# Patient Record
Sex: MALE | Race: WHITE
[De-identification: names, ages, dates, MRNs, and addresses within clinical notes are randomized per-mention and may not be internally consistent; named-entity substitution may affect disease eponyms.]

---

## 2017-12-17 ENCOUNTER — HOSPITAL ENCOUNTER (EMERGENCY)
Dept: HOSPITAL 62 - ER | Age: 37
Discharge: HOME | End: 2017-12-17
Payer: SELF-PAY

## 2017-12-17 VITALS — SYSTOLIC BLOOD PRESSURE: 147 MMHG | DIASTOLIC BLOOD PRESSURE: 97 MMHG

## 2017-12-17 DIAGNOSIS — R10.9: ICD-10-CM

## 2017-12-17 DIAGNOSIS — N30.00: Primary | ICD-10-CM

## 2017-12-17 DIAGNOSIS — F17.200: ICD-10-CM

## 2017-12-17 DIAGNOSIS — N20.0: ICD-10-CM

## 2017-12-17 LAB
ALBUMIN SERPL-MCNC: 4.3 G/DL (ref 3.5–5)
ALP SERPL-CCNC: 66 U/L (ref 38–126)
ALT SERPL-CCNC: 29 U/L (ref 21–72)
ANION GAP SERPL CALC-SCNC: 12 MMOL/L (ref 5–19)
APPEARANCE UR: (no result)
AST SERPL-CCNC: 26 U/L (ref 17–59)
BASOPHILS # BLD AUTO: 0.1 10^3/UL (ref 0–0.2)
BASOPHILS NFR BLD AUTO: 0.7 % (ref 0–2)
BILIRUB DIRECT SERPL-MCNC: 0.3 MG/DL (ref 0–0.4)
BILIRUB SERPL-MCNC: 0.7 MG/DL (ref 0.2–1.3)
BILIRUB UR QL STRIP: NEGATIVE
BUN SERPL-MCNC: 6 MG/DL (ref 7–20)
CALCIUM: 9.5 MG/DL (ref 8.4–10.2)
CHLORIDE SERPL-SCNC: 110 MMOL/L (ref 98–107)
CO2 SERPL-SCNC: 21 MMOL/L (ref 22–30)
CREAT SERPL-MCNC: 0.89 MG/DL (ref 0.52–1.25)
EOSINOPHIL # BLD AUTO: 0 10^3/UL (ref 0–0.6)
EOSINOPHIL NFR BLD AUTO: 0.2 % (ref 0–6)
ERYTHROCYTE [DISTWIDTH] IN BLOOD BY AUTOMATED COUNT: 13.2 % (ref 11.5–14)
GLUCOSE SERPL-MCNC: 92 MG/DL (ref 75–110)
GLUCOSE UR STRIP-MCNC: NEGATIVE MG/DL
HCT VFR BLD CALC: 46.7 % (ref 37.9–51)
HGB BLD-MCNC: 16.4 G/DL (ref 13.5–17)
HGB HCT DIFFERENCE: 2.5
KETONES UR STRIP-MCNC: 20 MG/DL
LYMPHOCYTES # BLD AUTO: 1.1 10^3/UL (ref 0.5–4.7)
LYMPHOCYTES NFR BLD AUTO: 12.7 % (ref 13–45)
MCH RBC QN AUTO: 33.4 PG (ref 27–33.4)
MCHC RBC AUTO-ENTMCNC: 35.2 G/DL (ref 32–36)
MCV RBC AUTO: 95 FL (ref 80–97)
MONOCYTES # BLD AUTO: 0.5 10^3/UL (ref 0.1–1.4)
MONOCYTES NFR BLD AUTO: 5.6 % (ref 3–13)
NEUTROPHILS # BLD AUTO: 6.8 10^3/UL (ref 1.7–8.2)
NEUTS SEG NFR BLD AUTO: 80.8 % (ref 42–78)
NITRITE UR QL STRIP: NEGATIVE
PH UR STRIP: 8 [PH] (ref 5–9)
POTASSIUM SERPL-SCNC: 4 MMOL/L (ref 3.6–5)
PROT SERPL-MCNC: 7 G/DL (ref 6.3–8.2)
PROT UR STRIP-MCNC: NEGATIVE MG/DL
RBC # BLD AUTO: 4.93 10^6/UL (ref 4.35–5.55)
SODIUM SERPL-SCNC: 142.8 MMOL/L (ref 137–145)
SP GR UR STRIP: 1.01
UROBILINOGEN UR-MCNC: NEGATIVE MG/DL (ref ?–2)
WBC # BLD AUTO: 8.4 10^3/UL (ref 4–10.5)

## 2017-12-17 PROCEDURE — 96375 TX/PRO/DX INJ NEW DRUG ADDON: CPT

## 2017-12-17 PROCEDURE — 76380 CAT SCAN FOLLOW-UP STUDY: CPT

## 2017-12-17 PROCEDURE — 99284 EMERGENCY DEPT VISIT MOD MDM: CPT

## 2017-12-17 PROCEDURE — 81001 URINALYSIS AUTO W/SCOPE: CPT

## 2017-12-17 PROCEDURE — 36415 COLL VENOUS BLD VENIPUNCTURE: CPT

## 2017-12-17 PROCEDURE — 87040 BLOOD CULTURE FOR BACTERIA: CPT

## 2017-12-17 PROCEDURE — 87086 URINE CULTURE/COLONY COUNT: CPT

## 2017-12-17 PROCEDURE — 83605 ASSAY OF LACTIC ACID: CPT

## 2017-12-17 PROCEDURE — S0119 ONDANSETRON 4 MG: HCPCS

## 2017-12-17 PROCEDURE — 85025 COMPLETE CBC W/AUTO DIFF WBC: CPT

## 2017-12-17 PROCEDURE — 96376 TX/PRO/DX INJ SAME DRUG ADON: CPT

## 2017-12-17 PROCEDURE — 80053 COMPREHEN METABOLIC PANEL: CPT

## 2017-12-17 PROCEDURE — 96365 THER/PROPH/DIAG IV INF INIT: CPT

## 2017-12-17 NOTE — ER DOCUMENT REPORT
ED Medical Screen (RME)





- General


Chief Complaint: Flank Pain


Stated Complaint: RIGHT FLANK PAIN


Time Seen by Provider: 12/17/17 16:09


Notes: 





37-year-old male patient complaining of flank pain.  Reports pain started this 

morning.  He sees a urologist in [facility].  He was in this area 

visiting today.  He reports he had a stone lasered 2 weeks ago, and remove the 

stent one half weeks ago.  He reports that stone was lasered 2 weeks prior to 

the last 1 on the other side.  He reports a total of 19 stones in his past.





Review of the [place] controlled database suggests his visits for pain 

medication are legitimate, unless he has been going in the [place] for 

treatment also.








I have greeted and performed a rapid initial assessment of this patient.  A 

comprehensive ED assessment and evaluation of the patient, analysis of test 

results and completion of the medical decision making process will be conducted 

by additional ED providers.


TRAVEL OUTSIDE OF THE [place] IN LAST 30 DAYS: No





- Related Data


Allergies/Adverse Reactions: 


 





ketorolac [From Toradol] Allergy (Verified 12/17/17 15:30)


 











Past Medical History





- Social History


Chew tobacco use (# tins/day): No


Frequency of alcohol use: None


Drug Abuse: None


Renal/ Medical History: Denies: Hx Peritoneal Dialysis





Physical Exam





- Vital signs


Vitals: 





 











Temp Pulse Resp BP Pulse Ox


 


 97.6 F   136 H  20   122/77   100 


 


 12/17/17 15:55  12/17/17 15:55  12/17/17 15:55  12/17/17 15:55  12/17/17 15:55














Course





- Vital Signs


Vital signs: 





 











Temp Pulse Resp BP Pulse Ox


 


 97.6 F   136 H  20   122/77   100 


 


 12/17/17 15:55  12/17/17 15:55  12/17/17 15:55  12/17/17 15:55  12/17/17 15:55

## 2017-12-17 NOTE — RADIOLOGY REPORT (SQ)
EXAM DESCRIPTION:  CT LTD RENAL STONE PROTOCOL ON



COMPLETED DATE/TIME:  12/17/2017 5:40 pm



REASON FOR STUDY:  right flank pain, h/o kidney stones, UTI



COMPARISON:  None.



TECHNIQUE:  CT scan of the abdomen and pelvis performed without intravenous or oral contrast. Images 
reviewed with lung, soft tissue, and bone windows. Reconstructed coronal and sagittal MPR images revi
ewed. All images stored on PACS.

All CT scanners at this facility use dose modulation, iterative reconstruction, and/or weight based d
osing when appropriate to reduce radiation dose to as low as reasonably achievable (ALARA).

[facility]: Dose Right  [facility]: CareDose    [facility]: Dose Right    [facility]: Teradose 4D    [facility]: Smart ProspectWise



RADIATION DOSE:  CT Rad equipment meets quality standard of care and radiation dose reduction techniq
ues were employed. CTDIvol: 4.8 mGy. DLP: 245 mGy-cm.mGy.



LIMITATIONS:  None.



FINDINGS:  LOWER CHEST: No significant findings. No nodules or infiltrates.

NON-CONTRASTED LIVER, SPLEEN, ADRENALS: Slightly heterogeneous regions in the left hepatic lobe inclu
ding a 2 cm low-density subcapsular focus close to the falciform ligament and at least 1 probable tin
y sub 6 cm cyst in the lateral segment left lobe.  Doubtful clinical significance in a patient of thi
s age.  Spleen unremarkable.  No adrenal pathology.

PANCREAS: No masses. No peripancreatic inflammatory changes.

GALLBLADDER: No identified stones by CT criteria. No inflammatory changes to suggest cholecystitis.

RIGHT KIDNEY AND URETER: Minimal punctate nonobstructing stone.  There is no evidence of hydronephros
is.  No ureteral stone evident.

LEFT KIDNEY AND URETER: Minimal nonobstructing nephrolithiasis, several tiny stones without obstructi
on evident.  No ureteral stones.

AORTA AND RETROPERITONEUM: No aneurysm. No retroperitoneal masses or adenopathy.

BOWEL AND PERITONEAL CAVITY: No obvious masses or inflammatory changes. No free fluid.

APPENDIX: Normal.

PELVIS, BLADDER, AND ABDOMINAL WALL:No abnormal masses. No free fluid. Bladder normal.

BONES: No significant findings.

OTHER: No other significant finding.



IMPRESSION:  1. Nonobstructing nephrolithiasis, bilateral.  No evidence, however, of significant hydr
onephrosis.  No ureteral stones.  No bladder stones.  2.  No acute abdominal abnormality.



TECHNICAL DOCUMENTATION:  JOB ID:  [id number]

Quality ID # [id number]: Final reports with documentation of one or more dose reduction techniques (e.g., Au
tomated exposure control, adjustment of the mA and/or kV according to patient size, use of iterative 
reconstruction technique)

 2011 [employer]- All Rights Reserved

## 2017-12-17 NOTE — ER DOCUMENT REPORT
ED GI/





- General


Chief Complaint: Flank Pain


Stated Complaint: RIGHT FLANK PAIN


Time Seen by Provider: 12/17/17 16:09


Notes: 





Patient is a 37-year-old male who presents emergency department complaining of 

right flank pain.  Patient states that he has a history of kidney stones with a 

past medical history significant for multiple stones in his left kidney 

requiring a stent placed 2 weeks ago and removed under a week ago.  Otherwise 

he denies any hematuria, decreased urinary output, fever or chills.  Denies any 

pyuria.





Was recently seen at [facility] for urology


TRAVEL OUTSIDE OF THE [place] IN LAST 30 DAYS: No





- Related Data


Allergies/Adverse Reactions: 


 





ketorolac [From Toradol] Allergy (Verified 12/17/17 17:41)


 











Past Medical History





- Social History


Smoking Status: Current Every Day Smoker


Chew tobacco use (# tins/day): No


Frequency of alcohol use: None


Drug Abuse: None


Family History: Reviewed & Not Pertinent


Patient has suicidal ideation: No


Patient has homicidal ideation: No


Renal/ Medical History: Denies: Hx Peritoneal Dialysis





Review of Systems





- Review of Systems


Constitutional: No symptoms reported


Cardiovascular: No symptoms reported


Respiratory: No symptoms reported


Gastrointestinal: No symptoms reported


Genitourinary: See HPI


-: Yes All other systems reviewed and negative





Physical Exam





- Vital signs


Vitals: 


 











Temp Pulse Resp BP Pulse Ox


 


 97.6 F   136 H  20   122/77   100 


 


 12/17/17 15:55  12/17/17 15:55  12/17/17 15:55  12/17/17 15:55  12/17/17 15:55














- Notes


Notes: 





PHYSICAL EXAM


GENERAL: Alert, interacts well. 


ENT: Oral mucosa moist, tongue midline. 


NECK: Full range of motion. Supple. Trachea midline.


LUNGS: Clear to auscultation bilaterally, no wheezes, rales, or rhonchi. No 

respiratory distress.


HEART: Regular rate and rhythm. No murmurs, gallops, or rubs.


ABDOMEN: Soft,  nondistended, nontender. No guarding, rebound, or rigidity.. 

Bowel sounds present in all 4 quadrants.


Back: mild right CVA tenderness without paralumbar or spinous process tenderness


EXTREMITIES: Moves all 4 extremities spontaneously. No edema, radial and 

dorsalis pedis pulses 2/4 bilaterally. No cyanosis. 


NEUROLOGICAL: Alert and oriented x4. Normal speech.


PSYCH: Normal affect, normal mood.


SKIN: Warm, dry, normal turgor. No rashes or lesions noted.








Course





- Re-evaluation


Re-evalutation: 





12/17/17 18:54


Patient is a 37-year-old male who is hemodynamically stable, no acute distress 

and afebrile.  Patient is visibly uncomfortable but otherwise cooperative.  

Heart rate is evidence of tachycardia which is unclear if related to dehydration

, pain.  Patient does appear to be very anxious and uncomfortable at this time.

  Urinalysis does show evidence of leuk esterase which is concerning for 

urinary tract infection given patient's presentation and symptoms, sepsis 

workup was initiated to rule out any evidence of septic stone.





12/17/17 19:16


CBC stable without any evidence of leukocytosis or anemia.  Chemistry without 

any evidence of electrolyte abnormalities, acute renal failure.  CT limited 

does not show any evidence of hydronephrosis or obstructing stone within the 

ureter.  Patient does have evidence of punctate small stones within both 

kidneys without associated hydronephrosis or stone greater than 5 mm.  

Discussed with patient that he will be able to be discharged home.  We will 

give him a dose of IV antibiotics here at this time and will be able to send 

him home with minimal pain medications to follow-up with his urologist tomorrow.











- Vital Signs


Vital signs: 


 











Temp Pulse Resp BP Pulse Ox


 


 98.3 F   136 H  14   147/97 H  98 


 


 12/17/17 17:50  12/17/17 15:55  12/17/17 21:57  12/17/17 22:02  12/17/17 21:57














- Laboratory


Result Diagrams: 


 12/17/17 16:56





 12/17/17 16:56


Laboratory results interpreted by me: 


 











  12/17/17 12/17/17 12/17/17





  16:23 16:56 16:56


 


Seg Neutrophils %   80.8 H 


 


Lymphocytes %   12.7 L 


 


Chloride    110 H


 


Carbon Dioxide    21 L


 


BUN    6 L


 


Urine Ketones  20 H  


 


Ur Leukocyte Esterase  LARGE H  


 


Urine Ascorbic Acid  40 H  














- Diagnostic Test


Radiology reviewed: Image reviewed, Reports reviewed





Discharge





- Discharge


Clinical Impression: 


 Kidney calculi





UTI (urinary tract infection)


Qualifiers:


 Urinary tract infection type: acute cystitis Hematuria presence: without 

hematuria Qualified Code(s): N30.00 - Acute cystitis without hematuria





Condition: Good


Disposition: HOME, SELF-CARE


Additional Instructions: 


*You do have evidence of multiple stones in your kidneys but none of them are 

passing, obstructing at this time.  Please take antibiotics as directed.  

Please follow-up with urologist tomorrow.











URINARY TRACT INFECTION:





     Your evaluation indicates that you have a urinary tract infection. This is 

due to germs growing in the bladder.  This is a common problem.


     This infection usually responds quickly to antibiotics.  Your antibiotic 

should be taken exactly as prescribed.  Drink plenty of fluids -- three to four 

quarts a day.


     Occasionally, a bladder anesthetic will be prescribed to help stop the 

feeling of urgency until the antibiotic has a chance to clear the infection.  

This may cause your urine to be dark orange.


     Certain urine infections require a culture.  If the doctor obtained a 

culture, the results will be back in two days.  You should call to see if a 

change in treatment is needed.


     A repeat urinalysis after you finish treatment is often recommended.  The 

physician will let you know if further testing is required.


     Call the doctor if you develop fever, chills, flank pain, inability to 

urinate, or blood in the urine.








ANTIBIOTIC THERAPY:


     You have been given an antibiotic prescription.  It's important that you 

take all the medication, unless instructed otherwise by your physician.  

Failure to complete the entire course can result in relapse of your condition.


     Common side effects of antibiotics include nausea, intestinal cramping, or 

diarrhea.  Women may develop vaginal yeast infections, and babies can get yeast 

(thrush) in the mouth following the use of antibiotics.  Contact your physician 

if you develop significant side effects from this medication.


     Allergy to this antibiotic can result in hives, wheezing, faintness, or 

itching.  If symptoms of allergy occur, stop the medication and call the doctor.








CIPROFLOXACIN:


     You have been given an antibacterial agent, ciprofloxacin (Cipro).  This 

medicine is not related to the penicillins, sulfas, cephalosporins, or 

tetracyclines.  It is often given to patients who are allergic to these drugs.  

It has been chosen for you either because other drugs are not appropriate, or 

because of the nature of your problem.


     Cipro should not be taken with antacids, as these can decrease its 

effectiveness.  It can be taken without regard to meals.  CIPRO SHOULD NOT BE 

TAKEN BY CHILDREN, NURSING WOMEN, OR PREGNANT WOMEN.


     Although Cipro is usually well-tolerated, common side effects can include 

nausea and diarrhea.  Contact your doctor if you experience any unusual 

symptoms while on this medication, such as joint pain or swelling, shortness of 

breath, wheezing, faintness, or hives.








FOLLOW-UP CARE:


If you have been referred to a physician for follow-up care, call the physician

s office for an appointment as you were instructed or within the next two days.

  If you experience worsening or a significant change in your symptoms, notify 

the physician immediately or return to the Emergency Department at any time for 

re-evaluation.











Prescriptions: 


Metoclopramide HCl [Reglan] 5 mg PO Q6HP PRN #15 tablet


 PRN Reason: 


Ciprofloxacin HCl [Cipro 500 mg Tablet] 500 mg PO BID #14 tablet

## 2018-01-27 ENCOUNTER — HOSPITAL ENCOUNTER (EMERGENCY)
Dept: HOSPITAL 62 - ER | Age: 38
Discharge: HOME | End: 2018-01-27
Payer: SELF-PAY

## 2018-01-27 VITALS — SYSTOLIC BLOOD PRESSURE: 118 MMHG | DIASTOLIC BLOOD PRESSURE: 72 MMHG

## 2018-01-27 DIAGNOSIS — Z87.442: ICD-10-CM

## 2018-01-27 DIAGNOSIS — F17.200: ICD-10-CM

## 2018-01-27 DIAGNOSIS — R11.0: ICD-10-CM

## 2018-01-27 DIAGNOSIS — Z88.8: ICD-10-CM

## 2018-01-27 DIAGNOSIS — R31.9: ICD-10-CM

## 2018-01-27 DIAGNOSIS — R10.9: Primary | ICD-10-CM

## 2018-01-27 LAB
ADD MANUAL DIFF: NO
ALBUMIN SERPL-MCNC: 4 G/DL (ref 3.5–5)
ALP SERPL-CCNC: 87 U/L (ref 38–126)
ALT SERPL-CCNC: 61 U/L (ref 21–72)
ANION GAP SERPL CALC-SCNC: 12 MMOL/L (ref 5–19)
APPEARANCE UR: CLEAR
APTT PPP: (no result) S
AST SERPL-CCNC: 44 U/L (ref 17–59)
BASOPHILS # BLD AUTO: 0.1 10^3/UL (ref 0–0.2)
BASOPHILS NFR BLD AUTO: 0.5 % (ref 0–2)
BILIRUB DIRECT SERPL-MCNC: 0.2 MG/DL (ref 0–0.4)
BILIRUB SERPL-MCNC: 0.5 MG/DL (ref 0.2–1.3)
BILIRUB UR QL STRIP: NEGATIVE
BUN SERPL-MCNC: 8 MG/DL (ref 7–20)
CALCIUM: 10.2 MG/DL (ref 8.4–10.2)
CHLORIDE SERPL-SCNC: 105 MMOL/L (ref 98–107)
CO2 SERPL-SCNC: 26 MMOL/L (ref 22–30)
EOSINOPHIL # BLD AUTO: 0.2 10^3/UL (ref 0–0.6)
EOSINOPHIL NFR BLD AUTO: 1.5 % (ref 0–6)
ERYTHROCYTE [DISTWIDTH] IN BLOOD BY AUTOMATED COUNT: 13.5 % (ref 11.5–14)
GLUCOSE SERPL-MCNC: 82 MG/DL (ref 75–110)
GLUCOSE UR STRIP-MCNC: NEGATIVE MG/DL
HCT VFR BLD CALC: 44 % (ref 37.9–51)
HGB BLD-MCNC: 15 G/DL (ref 13.5–17)
KETONES UR STRIP-MCNC: NEGATIVE MG/DL
LYMPHOCYTES # BLD AUTO: 2 10^3/UL (ref 0.5–4.7)
LYMPHOCYTES NFR BLD AUTO: 16.8 % (ref 13–45)
MCH RBC QN AUTO: 31.6 PG (ref 27–33.4)
MCHC RBC AUTO-ENTMCNC: 34.1 G/DL (ref 32–36)
MCV RBC AUTO: 93 FL (ref 80–97)
MONOCYTES # BLD AUTO: 0.8 10^3/UL (ref 0.1–1.4)
MONOCYTES NFR BLD AUTO: 6.9 % (ref 3–13)
NEUTROPHILS # BLD AUTO: 9.1 10^3/UL (ref 1.7–8.2)
NEUTS SEG NFR BLD AUTO: 74.3 % (ref 42–78)
NITRITE UR QL STRIP: NEGATIVE
PH UR STRIP: 9 [PH] (ref 5–9)
PLATELET # BLD: 494 10^3/UL (ref 150–450)
POTASSIUM SERPL-SCNC: 4.1 MMOL/L (ref 3.6–5)
PROT SERPL-MCNC: 7.1 G/DL (ref 6.3–8.2)
PROT UR STRIP-MCNC: 30 MG/DL
RBC # BLD AUTO: 4.76 10^6/UL (ref 4.35–5.55)
SODIUM SERPL-SCNC: 143.1 MMOL/L (ref 137–145)
SP GR UR STRIP: 1
TOTAL CELLS COUNTED % (AUTO): 100 %
UROBILINOGEN UR-MCNC: NEGATIVE MG/DL (ref ?–2)
WBC # BLD AUTO: 12.2 10^3/UL (ref 4–10.5)

## 2018-01-27 PROCEDURE — 99284 EMERGENCY DEPT VISIT MOD MDM: CPT

## 2018-01-27 PROCEDURE — 81001 URINALYSIS AUTO W/SCOPE: CPT

## 2018-01-27 PROCEDURE — 96374 THER/PROPH/DIAG INJ IV PUSH: CPT

## 2018-01-27 PROCEDURE — 96375 TX/PRO/DX INJ NEW DRUG ADDON: CPT

## 2018-01-27 PROCEDURE — 80053 COMPREHEN METABOLIC PANEL: CPT

## 2018-01-27 PROCEDURE — 36415 COLL VENOUS BLD VENIPUNCTURE: CPT

## 2018-01-27 PROCEDURE — 85025 COMPLETE CBC W/AUTO DIFF WBC: CPT

## 2018-01-27 NOTE — ER DOCUMENT REPORT
ED Medical Screen (RME)





- General


Chief Complaint: Flank Pain


Stated Complaint: RIGHT FLANK PAIN,BLOOD IN URINE


Time Seen by Provider: 01/27/18 19:18


Mode of Arrival: Ambulatory


Information source: Patient


Notes: 





37-year-old male history of kidney stones presents with complaints of right 

flank pain associated with blood in urine.  Patient notes he had imaging last 

month denies any fevers or chills





I have greeted and performed a rapid initial assessment of this patient.  A 

comprehensive ED assessment and evaluation of the patient, analysis of test 

results and completion of the medical decision making process will be conducted 

by additional ED providers.





PHYSICAL EXAMINATION:





GENERAL: Well-appearing, well-nourished and in no acute distress.





HEAD: Atraumatic, normocephalic.





EYES: Pupils equal round extraocular movements intact,  conjunctiva are normal.





ENT: Nares patent





NECK: Normal range of motion





LUNGS: No respiratory distress





Musculoskeletal: Normal range of motion mild right cva tenderness 





NEUROLOGICAL:  Normal speech, normal gait. 





PSYCH: Normal mood, normal affect.





SKIN: Warm, Dry, normal turgor, no rashes or lesions noted.


TRAVEL OUTSIDE OF THE [place] IN LAST 30 DAYS: No





- Related Data


Allergies/Adverse Reactions: 


 





ketorolac [From Toradol] Allergy (Verified 01/27/18 18:29)


 











Past Medical History


Renal/ Medical History: Denies: Hx Peritoneal Dialysis


Past Surgical History: Reports: Hx Kidney (Renal Surgery)





Physical Exam





- Vital signs


Vitals: 





 











Temp Pulse Resp BP Pulse Ox


 


 98.2 F   88   18   103/72   95 


 


 01/27/18 18:34  01/27/18 18:34  01/27/18 18:34  01/27/18 18:34  01/27/18 18:34














Course





- Vital Signs


Vital signs: 





 











Temp Pulse Resp BP Pulse Ox


 


 98.2 F   88   18   103/72   95 


 


 01/27/18 18:34  01/27/18 18:34  01/27/18 18:34  01/27/18 18:34  01/27/18 18:34

## 2018-01-27 NOTE — ER DOCUMENT REPORT
ED GI/





- General


Chief Complaint: Flank Pain


Stated Complaint: RIGHT FLANK PAIN,BLOOD IN URINE


Time Seen by Provider: 01/27/18 19:18


Mode of Arrival: Ambulatory


Information source: Patient


Notes: 





Patient reports developing right flank pain today with nausea.  Patient denies 

any vomiting or diarrhea.  Patient denies any abdominal pain.  Patient does 

report hematuria.  Patient has a history of known bilateral kidney stones and 

has had lithotripsy in the past.  Patient does see a urologist in his hometown.

  Patient is here visiting for work.


TRAVEL OUTSIDE OF THE [place] IN LAST 30 DAYS: No





- HPI


Patient complains to provider of: Flank pain


Onset: This afternoon


Timing/Duration: Gradual


Quality of pain: Sharp


Pain Level: 4


Location: Right flank


Associated symptoms: Hematuria.  denies: Diarrhea, Dysuria, Fever, Urinary 

hesitancy, Urinary frequency, Urinary retention, Urinary urgency


Exacerbated by: Denies


Relieved by: Denies


Similar symptoms previously: Yes


Recently seen / treated by doctor: No





- Related Data


Allergies/Adverse Reactions: 


 





ketorolac [From Toradol] Allergy (Verified 01/27/18 18:29)


 











Past Medical History





- General


Information source: Patient





- Social History


Smoking Status: Current Every Day Smoker


Chew tobacco use (# tins/day): No


Smoking Education Provided: Yes


Frequency of alcohol use: Occasional


Drug Abuse: None


Occupation: [employer]


Family History: Reviewed & Not Pertinent


Patient has suicidal ideation: No


Patient has homicidal ideation: No


Renal/ Medical History: Reports: Hx Kidney Stones.  Denies: Hx Peritoneal 

Dialysis


Past Surgical History: Reports: Hx Kidney (Renal Surgery)





Review of Systems





- Review of Systems


Constitutional: No symptoms reported.  denies: Fever


EENT: No symptoms reported


Cardiovascular: No symptoms reported


Respiratory: No symptoms reported


Gastrointestinal: Nausea.  denies: Abdominal pain, Diarrhea, Vomiting


Genitourinary: Flank pain, Hematuria.  denies: Dysuria


Male Genitourinary: No symptoms reported


Musculoskeletal: Back pain


Skin: No symptoms reported


Hematologic/Lymphatic: No symptoms reported


Neurological/Psychological: No symptoms reported





Physical Exam





- Vital signs


Vitals: 


 











Temp Pulse Resp BP Pulse Ox


 


 98.2 F   88   18   103/72   95 


 


 01/27/18 18:34  01/27/18 18:34  01/27/18 18:34  01/27/18 18:34  01/27/18 18:34














- General


General appearance: Appears well, Alert


In distress: None





- HEENT


Head: Normocephalic, Atraumatic


Eyes: Normal


Conjunctiva: Normal


Nasal: Normal


Mouth/Lips: Normal


Mucous membranes: Normal


Neck: Normal, Supple.  No: Lymphadenopathy





- Respiratory


Respiratory status: No respiratory distress


Chest status: Nontender


Breath sounds: Normal.  No: Rales, Rhonchi, Stridor, Wheezing


Chest palpation: Normal





- Cardiovascular


Rhythm: Regular


Heart sounds: S1 appreciated, S2 appreciated





- Abdominal


Inspection: Normal


Distension: No distension


Bowel sounds: Normal


Tenderness: Nontender


Organomegaly: No organomegaly





- Back


Back: CVA tenderness - right





- Extremities


General upper extremity: Normal inspection, Nontender, Normal strength


General lower extremity: Normal inspection, Nontender, Normal strength





- Neurological


Neuro grossly intact: Yes


Cognition: Normal


Hieu Coma Scale Eye Opening: Spontaneous


Hieu Coma Scale Verbal: Oriented


Hieu Coma Scale Motor: Obeys Commands


Hieu Coma Scale Total: 15





- Psychological


Associated symptoms: Normal affect, Normal mood





- Skin


Skin Temperature: Warm


Skin Moisture: Dry


Skin Color: Normal





Course





- Re-evaluation


Re-evalutation: 





01/27/18 20:42


Patient with known history of renal calculi.  Patient with no concerning 

symptoms for urinary tract infection or pyelonephritis at this time.  Normal 

renal function test, no concern for obstructive uropathy at this time.  Patient 

does have a urologist at home and is encouraged to follow-up with them this 

week for recheck.  Patient has had recent CT scan last month and the report was 

reviewed.  Consulted with Dr. Viveros regarding patient presentation and agrees 

with discharge plan of care.


01/27/18 20:47


Smoking cessation handout provided to patient


01/27/18 20:48


Controlled substance database reviewed





- Vital Signs


Vital signs: 


 











Temp Pulse Resp BP Pulse Ox


 


 98.2 F   88   18   103/72   95 


 


 01/27/18 18:34  01/27/18 18:34  01/27/18 18:34  01/27/18 18:34  01/27/18 18:34














- Laboratory


Result Diagrams: 


 01/27/18 19:40





 01/27/18 19:40


Laboratory results interpreted by me: 


 











  01/27/18 01/27/18





  19:40 19:40


 


WBC  12.2 H 


 


Plt Count  494 H 


 


Absolute Neutrophils  9.1 H 


 


Urine Protein   30 H


 


Urine Blood   LARGE H











01/27/18 20:42





 Labs- Entire Visit











  01/27/18 01/27/18 01/27/18





  19:40 19:40 19:40


 


WBC  12.2 H  


 


RBC  4.76  


 


Hgb  15.0  


 


Hct  44.0  


 


MCV  93  


 


MCH  31.6  


 


MCHC  34.1  


 


RDW  13.5  


 


Plt Count  494 H  


 


Seg Neutrophils %  74.3  


 


Lymphocytes %  16.8  


 


Monocytes %  6.9  


 


Eosinophils %  1.5  


 


Basophils %  0.5  


 


Absolute Neutrophils  9.1 H  


 


Absolute Lymphocytes  2.0  


 


Absolute Monocytes  0.8  


 


Absolute Eosinophils  0.2  


 


Absolute Basophils  0.1  


 


Sodium   143.1 


 


Potassium   4.1 


 


Chloride   105 


 


Carbon Dioxide   26 


 


Anion Gap   12 


 


BUN   8 


 


Creatinine   0.91 


 


Est GFR ( Amer)   > 60 


 


Est GFR (Non-Af Amer)   > 60 


 


Glucose   82 


 


Calcium   10.2 


 


Total Bilirubin   0.5 


 


Direct Bilirubin   0.2 


 


Neonat Total Bilirubin   Not Reportable 


 


Neonat Direct Bilirubin   Not Reportable 


 


Neonat Indirect Bili   Not Reportable 


 


AST   44 


 


ALT   61 


 


Alkaline Phosphatase   87 


 


Total Protein   7.1 


 


Albumin   4.0 


 


Urine Color    PINK


 


Urine Appearance    CLEAR


 


Urine pH    9.0


 


Ur Specific Gravity    1.002


 


Urine Protein    30 H


 


Urine Glucose (UA)    NEGATIVE


 


Urine Ketones    NEGATIVE


 


Urine Blood    LARGE H


 


Urine Nitrite    NEGATIVE


 


Urine Bilirubin    NEGATIVE


 


Urine Urobilinogen    NEGATIVE


 


Ur Leukocyte Esterase    NEGATIVE


 


Urine WBC (Auto)    8


 


Urine RBC (Auto)    20


 


Urine Bacteria (Auto)    TRACE


 


Urine Mucus (Auto)    RARE


 


Urine Ascorbic Acid    NEGATIVE








Reviewed labs from patient's previous ER visit





- Diagnostic Test


Radiology reviewed: Reports reviewed - Reviewed radiology reports from previous 

ER visit





Discharge





- Discharge


Clinical Impression: 


 Flank pain, Hx of renal calculi





Condition: Stable


Disposition: HOME, SELF-CARE


Instructions:  Antinausea Medication ([facility]), Flank Pain ([facility]), Kidney Stone ([facility])

, Oral Narcotic Medication ([facility])


Additional Instructions: 


Return immediately for any new or worsening symptoms





Followup with your primary care provider, call tomorrow to make a followup 

appointment





Follow-up with your urologist for further evaluation, call Monday for an 

appointment


Prescriptions: 


Oxycodone HCl/Acetaminophen [Percocet 5-325 mg Tablet] 1 tab PO ASDIR PRN #15 

tablet


 PRN Reason: 


Promethazine HCl [Phenergan 25 mg Tablet] 25 mg PO Q6H PRN #8 tablet


 PRN Reason: 


Forms:  Smoking Cessation Education, Return to Work


Referrals: 


[facility] [Provider Group] - Follow up as needed


[facility] [Provider Group] - Follow up as needed

## 2018-01-29 ENCOUNTER — HOSPITAL ENCOUNTER (EMERGENCY)
Dept: HOSPITAL 62 - ER | Age: 38
Discharge: HOME | End: 2018-01-29
Payer: SELF-PAY

## 2018-01-29 VITALS — SYSTOLIC BLOOD PRESSURE: 108 MMHG | DIASTOLIC BLOOD PRESSURE: 67 MMHG

## 2018-01-29 DIAGNOSIS — Z88.8: ICD-10-CM

## 2018-01-29 DIAGNOSIS — Z79.891: ICD-10-CM

## 2018-01-29 DIAGNOSIS — N20.0: Primary | ICD-10-CM

## 2018-01-29 DIAGNOSIS — F17.200: ICD-10-CM

## 2018-01-29 DIAGNOSIS — R31.0: ICD-10-CM

## 2018-01-29 LAB
APPEARANCE UR: (no result)
APTT PPP: YELLOW S
BILIRUB UR QL STRIP: NEGATIVE
GLUCOSE UR STRIP-MCNC: NEGATIVE MG/DL
KETONES UR STRIP-MCNC: NEGATIVE MG/DL
NITRITE UR QL STRIP: NEGATIVE
PH UR STRIP: 7 [PH] (ref 5–9)
PROT UR STRIP-MCNC: 30 MG/DL
SP GR UR STRIP: 1.02
UROBILINOGEN UR-MCNC: 2 MG/DL (ref ?–2)

## 2018-01-29 PROCEDURE — 74176 CT ABD & PELVIS W/O CONTRAST: CPT

## 2018-01-29 PROCEDURE — 81001 URINALYSIS AUTO W/SCOPE: CPT

## 2018-01-29 PROCEDURE — 96374 THER/PROPH/DIAG INJ IV PUSH: CPT

## 2018-01-29 PROCEDURE — 99284 EMERGENCY DEPT VISIT MOD MDM: CPT

## 2018-01-29 NOTE — ER DOCUMENT REPORT
ED General





- General


Chief Complaint: Possible Kidney Stone


Stated Complaint: FLANK PAIN


Time Seen by Provider: 01/29/18 09:12


Notes: 





37 year male presents with right flank pain severe onset today.  It has been 

going on for a week and he was seen here, did not receive imaging and was given 

pain medicine which he states he is taking.  He says this will be as 

20something kidney stone.  He says he is allergic to Toradol.  He denies fever 

and chills.  His urine has been pink.  He was sent to a urologist here in 

[place] but his pain was so bad this morning he could call for an 

appointment.


TRAVEL OUTSIDE OF THE [place] IN LAST 30 DAYS: No





- Related Data


Allergies/Adverse Reactions: 


 





ketorolac [From Toradol] Allergy (Verified 01/29/18 08:33)


 











Past Medical History





- Social History


Smoking Status: Current Every Day Smoker


Chew tobacco use (# tins/day): No


Smoking Education Provided: Yes - The patient ED visit today was directly 

related to their abuse of tobacco. 


Frequency of alcohol use: None


Drug Abuse: None


Family History: Reviewed & Not Pertinent


Patient has suicidal ideation: No


Patient has homicidal ideation: No


Renal/ Medical History: Reports: Hx Kidney Stones.  Denies: Hx Peritoneal 

Dialysis


Past Surgical History: Reports: Hx Kidney (Renal Surgery)





Review of Systems





- Review of Systems


Notes: 





REVIEW OF SYSTEMS





GEN: Denies fever, chills, weight loss


ENT: Denies sore throat, nasal discharge, ear pain


EYES: Denies blurry vision, eye pain, discharge


CV: Denies chest pain, palpitations, edema


RESP: Denies cough, shortness of breath, wheezing


GI: Denies abdominal pain, nausea, vomiting, diarrhea


MSK: Back pain, 


SKIN: Denies rash, skin lesions


LYMPH: Denies swollen glands/lymph nodes


NEURO: Denies headache, focal weakness or numbness, dizziness


PSYCH: Denies depression, suicidal or homicidal ideation








PHYSICAL EXAMINATION





General: No acute distress, well-nourished


Head: Atraumatic, normocephalic


ENT: Mouth normal, oropharynx moist, no exudates or tonsillar enlargement


Eyes: Conjunctiva normal, pupils equal, lids normal


Neck: No JVD, supple, no guarding


CVS: Normal rate, regular rhythm, no murmurs


Resp: No resp distress, equal and normal breath sounds bilaterally


GI: Nondistended, soft, no tenderness to palpation, no rebound or guarding


Ext: No deformities, no edema, normal range of motion in upper and lower ext


Back: No CVA or midline TTP


Skin: No rash, warm


Lymphatic: No lymphadeopathy noted


Neuro: Awake, alert.  Face symmetric.  GCS 15.





Physical Exam





- Vital signs


Vitals: 


 











Temp Pulse Resp BP Pulse Ox


 


 98.4 F   118 H  20   113/70   98 


 


 01/29/18 08:34  01/29/18 08:34  01/29/18 08:34  01/29/18 08:34  01/29/18 08:34














Course





- Re-evaluation


Re-evalutation: 





01/29/18 09:35


Repeat episode of flank pain and 37-year-old male with history of kidney 

stones.  He has gross hematuria on my examination of his urine but no 

tenderness.


We will give IM morphine and get a CT to characterize stones.  His last imaging 

study was in December which showed bilateral nonobstructing stones.  I have a 

slight suspicion of drug-seeking behavior on top of chronic disease.


01/29/18 10:11


Reviewed the state drug database.  Patient has been prescribed multiple 

narcotics by countless different providers all in the last 3 months including 

for outside emergency departments.  I have concerns over his use of pain 

medication.  This is especially given that the last time he was imaged there 

was no obstructing stone.  Regardless of his diagnosis I am reticent to 

discharge with pain medication today.  I will insist he follow up with primary 

care and urology.


01/29/18 10:28


Patient again shows evidence of nonobstructing nephrolithiasis.  I checked a 

stat state database.  I reviewed his concerning opioid use and told him he 

would not be receiving any meds from us.  He will follow-up with urology.  I 

have discussed with the patient there likely diagnosis, aftercare plan, follow-

up plans and my usual and customary return precautions.  They verbalized 

understanding of this.





- Vital Signs


Vital signs: 


 











Temp Pulse Resp BP Pulse Ox


 


 98.0 F   104 H  20   108/67   98 


 


 01/29/18 10:25  01/29/18 10:25  01/29/18 08:34  01/29/18 10:25  01/29/18 10:25














- Laboratory


Laboratory results interpreted by me: 


 











  01/29/18





  09:31


 


Urine Protein  30 H


 


Urine Blood  LARGE H


 


Urine Urobilinogen  2.0 H


 


Ur Leukocyte Esterase  MODERATE H














- Diagnostic Test


Radiology reviewed: Image reviewed, Reports reviewed





Discharge





- Discharge


Clinical Impression: 


 Nephrolithiasis





Condition: Good


Disposition: HOME, SELF-CARE


Additional Instructions: 


You have tiny kidney stones in your kidneys but none of them are obstructing 

and they should not be causing the pain or having.  As we discussed and 

concerned over your use of multiple pain medications from multiple providers 

and will not be providing you narcotic prescription today.  Please follow-up 

with your primary care and your urologist.  You may take ibuprofen for pain as 

well as the Percocet you were prescribed 2 days ago.

## 2018-01-29 NOTE — RADIOLOGY REPORT (SQ)
EXAM DESCRIPTION:  CT ABD/PELVIS NO ORAL OR IV



COMPLETED DATE/TIME:  1/29/2018 9:37 am



REASON FOR STUDY:  stones



COMPARISON:  None.



TECHNIQUE:  CT scan of the abdomen and pelvis performed without intravenous or oral contrast. Images 
reviewed with lung, soft tissue, and bone windows. Reconstructed coronal and sagittal MPR images revi
ewed. All images stored on PACS.

All CT scanners at this facility use dose modulation, iterative reconstruction, and/or weight based d
osing when appropriate to reduce radiation dose to as low as reasonably achievable (ALARA).

[facility]: Dose Right  [facility]: CareDose    [facility]: Dose Right    [facility]: Teradose 4D    [facility]: Smart Bluebox Now!



RADIATION DOSE:  CT Rad equipment meets quality standard of care and radiation dose reduction techniq
ues were employed. CTDIvol: 4.8 mGy. DLP: 249 mGy-cm.mGy.



LIMITATIONS:  None.



FINDINGS:  LOWER CHEST: No significant findings. No nodules or infiltrates.

NON-CONTRASTED LIVER, SPLEEN, ADRENALS: Evaluation limited by lack of IV contrast. No identified sign
ificant masses.

PANCREAS: No masses. No peripancreatic inflammatory changes.

GALLBLADDER: No identified stones by CT criteria. No inflammatory changes to suggest cholecystitis.

RIGHT KIDNEY AND URETER: No suspicious masses. Assessment limited by lack of IV contrast.   Renal alejandra
culi measuring up to about 2 mm.   No hydronephrosis or hydroureter.

LEFT KIDNEY AND URETER: No suspicious masses. Assessment limited by lack of IV contrast.   Renal calc
mckinley measuring up to about 2 mm.   No hydronephrosis or hydroureter.

AORTA AND RETROPERITONEUM: No aneurysm. No retroperitoneal masses or adenopathy.

BOWEL AND PERITONEAL CAVITY: No obvious masses or inflammatory changes. No free fluid.

APPENDIX: Normal.

PELVIS, BLADDER, AND ABDOMINAL WALL:No abnormal masses. No free fluid. Bladder normal.

BONES: No significant findings.

OTHER: No other significant finding.



IMPRESSION:  Nonobstructing renal calculi.



COMMENT:  Quality ID # [id number]: Final reports with documentation of one or more dose reduction techniques
 (e.g., Automated exposure control, adjustment of the mA and/or kV according to patient size, use of 
iterative reconstruction technique)



TECHNICAL DOCUMENTATION:  JOB ID:  [id number]

 [facility]- All Rights Reserved